# Patient Record
Sex: MALE | Race: WHITE | NOT HISPANIC OR LATINO | Employment: FULL TIME | URBAN - METROPOLITAN AREA
[De-identification: names, ages, dates, MRNs, and addresses within clinical notes are randomized per-mention and may not be internally consistent; named-entity substitution may affect disease eponyms.]

---

## 2021-11-30 ENCOUNTER — OFFICE VISIT (OUTPATIENT)
Dept: GASTROENTEROLOGY | Facility: CLINIC | Age: 46
End: 2021-11-30

## 2021-11-30 VITALS
HEIGHT: 72 IN | HEART RATE: 53 BPM | WEIGHT: 263 LBS | SYSTOLIC BLOOD PRESSURE: 121 MMHG | BODY MASS INDEX: 35.62 KG/M2 | DIASTOLIC BLOOD PRESSURE: 85 MMHG

## 2021-11-30 DIAGNOSIS — K76.0 FATTY LIVER: Primary | ICD-10-CM

## 2021-11-30 PROCEDURE — 99204 OFFICE O/P NEW MOD 45 MIN: CPT | Performed by: INTERNAL MEDICINE

## 2021-11-30 RX ORDER — ATORVASTATIN CALCIUM 10 MG/1
TABLET, FILM COATED ORAL
COMMUNITY
Start: 2021-09-15

## 2021-12-17 ENCOUNTER — TELEPHONE (OUTPATIENT)
Dept: GASTROENTEROLOGY | Facility: CLINIC | Age: 46
End: 2021-12-17

## 2021-12-17 NOTE — TELEPHONE ENCOUNTER
pts wife called saying we were supposed to send a medication in for this patient to the [facility] [place]-discussed at his 11/30 office visit, possibly a PPI, she wasn't sure what medication.

## 2022-02-01 RX ORDER — SOD SULF/POT CHLORIDE/MAG SULF 1.479 G
1 TABLET ORAL ONCE
Qty: 24 TABLET | Refills: 0 | Status: SHIPPED | OUTPATIENT
Start: 2022-02-01 | End: 2022-02-01

## 2022-02-11 ENCOUNTER — OUTSIDE FACILITY SERVICE (OUTPATIENT)
Dept: GASTROENTEROLOGY | Facility: CLINIC | Age: 47
End: 2022-02-11

## 2022-02-11 PROCEDURE — 45385 COLONOSCOPY W/LESION REMOVAL: CPT | Performed by: INTERNAL MEDICINE

## 2022-02-11 PROCEDURE — 88305 TISSUE EXAM BY PATHOLOGIST: CPT | Performed by: INTERNAL MEDICINE

## 2022-02-14 ENCOUNTER — LAB REQUISITION (OUTPATIENT)
Dept: LAB | Facility: HOSPITAL | Age: 47
End: 2022-02-14

## 2022-02-14 DIAGNOSIS — Z12.11 ENCOUNTER FOR SCREENING FOR MALIGNANT NEOPLASM OF COLON: ICD-10-CM

## 2022-02-14 DIAGNOSIS — D12.8 BENIGN NEOPLASM OF RECTUM: ICD-10-CM

## 2022-02-14 DIAGNOSIS — D12.7 BENIGN NEOPLASM OF RECTOSIGMOID JUNCTION: ICD-10-CM

## 2022-02-14 DIAGNOSIS — D12.3 BENIGN NEOPLASM OF TRANSVERSE COLON: ICD-10-CM

## 2022-02-15 LAB
CYTO UR: NORMAL
LAB AP CASE REPORT: NORMAL
LAB AP CLINICAL INFORMATION: NORMAL
PATH REPORT.FINAL DX SPEC: NORMAL
PATH REPORT.GROSS SPEC: NORMAL

## 2022-02-22 ENCOUNTER — TELEMEDICINE (OUTPATIENT)
Dept: GASTROENTEROLOGY | Facility: CLINIC | Age: 47
End: 2022-02-22

## 2022-02-22 DIAGNOSIS — R10.13 DISTRESS, EPIGASTRIC: ICD-10-CM

## 2022-02-22 DIAGNOSIS — K76.0 FATTY LIVER: Primary | ICD-10-CM

## 2022-02-22 PROCEDURE — 99214 OFFICE O/P EST MOD 30 MIN: CPT | Performed by: INTERNAL MEDICINE

## 2022-02-23 NOTE — PROGRESS NOTES
PCP:  Maria Esther Flynn MD     No referring provider defined for this encounter.    Chief Complaint   Patient presents with   • Abdominal Pain    You have chosen to receive care through a telehealth visit.  Do you consent to use a video/audio connection for your medical care today? Yes      HPI   The patient is a 46-year-old gentleman who had a recent colonoscopy on 02/11/2022.  He had hyperplastic polyps removed and a 10-year follow-up was suggested.  He was incidentally found to have fatty liver on CAT scan.  No hepatic mass was noted.  This was on 08/21/2021.  He has no history of elevated liver chemistries.  His liver chemistries on 07/30/2021 showed a bilirubin of 0.2, alk phos of 75, AST of 25, ALT of 40.  More recently his bilirubin was 0.2, AST 25, ALT 41.  This was on 01/03/2022.  His DILL Fibrosure at that time showed probable steatohepatitis with marked fatty liver.  No fibrosis was predicted.  Hepatitis C test was negative.    No Known Allergies       Current Outpatient Medications:   •  atorvastatin (LIPITOR) 10 MG tablet, , Disp: , Rfl:      Past Medical History:   Diagnosis Date   • Hyperlipidemia        History reviewed. No pertinent surgical history.     Social History     Socioeconomic History   • Marital status:    Tobacco Use   • Smoking status: Never Smoker   • Smokeless tobacco: Never Used   Substance and Sexual Activity   • Alcohol use: Never   • Drug use: Never        Family History   Problem Relation Age of Onset   • Colon cancer Neg Hx    • Colon polyps Neg Hx         Review of Systems     There were no vitals filed for this visit.     Physical Exam   General Appearance: Alert, in no acute distress   Head: Normocephalic, without obvious abnormality, atraumatic       Diagnoses and all orders for this visit:    1. Fatty liver (Primary)    2. Distress, epigastric    Impressions and plan #1 fatty liver: We again stressed modest weight reduction.  He has lost weight.  He is about 11  pounds down from his previous weight.  It would be prudent to check hepatitis A immunity.  We can check his hepatitis B status as well.  He could benefit from vaccination if he is not immune.    #2 epigastric distress: We are going to try some Protonix.  He has only recently started that he is not sure he is given enough time to work.  He does get more epigastric pain when he overeats.  He has been watching recently.  We will see him back in follow-up.  If the Protonix is not helpful we may need to evaluate with an upper endoscopy.  He will contact us if that is the case.    Can Toledo MD

## 2023-11-27 ENCOUNTER — OFFICE VISIT (OUTPATIENT)
Dept: GASTROENTEROLOGY | Facility: CLINIC | Age: 48
End: 2023-11-27
Payer: COMMERCIAL

## 2023-11-27 DIAGNOSIS — R79.89 ELEVATED FERRITIN: ICD-10-CM

## 2023-11-27 DIAGNOSIS — Z98.890 HISTORY OF COLONOSCOPY: ICD-10-CM

## 2023-11-27 DIAGNOSIS — E66.01 CLASS 2 SEVERE OBESITY WITH SERIOUS COMORBIDITY AND BODY MASS INDEX (BMI) OF 36.0 TO 36.9 IN ADULT, UNSPECIFIED OBESITY TYPE: ICD-10-CM

## 2023-11-27 DIAGNOSIS — K75.81 NASH (NONALCOHOLIC STEATOHEPATITIS): ICD-10-CM

## 2023-11-27 DIAGNOSIS — R74.8 ABNORMAL LIVER ENZYMES: Primary | ICD-10-CM

## 2023-11-27 RX ORDER — PANTOPRAZOLE SODIUM 40 MG/1
TABLET, DELAYED RELEASE ORAL
COMMUNITY
Start: 2023-09-15

## 2023-11-27 NOTE — PROGRESS NOTES
GASTROENTEROLOGY OFFICE NOTE    Milo Stubbs  [id number]  1975    CARE TEAM  Patient Care Team:  Maria Esther Flynn MD as PCP - General (Emergency Medicine)    Referring Provider: No ref. provider found    Chief Complaint   Patient presents with    Elevated Hepatic Enzymes     Follow up. States he had some abnormal labs at PCP.         HISTORY OF PRESENT ILLNESS:   Milo Stubbs is a 48 y.o. male who returns to the office due to elevated liver enzymes. 11/17/2023 ferritin elevated at 706. GGT normal 38. CMP with hyperglycemia result of 118, elevated AST 54 and elevated ALT of 92. B12 and folate normal. Iron profile normal with iron saturation of 42%. Acute hepatitis panel negative. Amylase and lipase normal.       Review of record reveals Evaluation by Dr. Toledo 11/30/2021 following ultrasound that revealed fatty infiltration of the liver performed due to burning in the right upper quadrant, 4.5 cm echogenic focus in the left lobe of the liver for which CT or MRI was recommended alkaline phosphatase was 75, AST 25, ALT 40, and bilirubin 0.2. On 8/7/2021 alkaline phosphatase was 75, AST 25, ALT 40, and bilirubin 0.2 .  Review of documentation reveals his weight in high school was 135 pounds,  weight 262 pounds at time of that visit, today's weight 269.8 (weight rechecked after removing items from his pockets).  CT abdomen and pelvis with contrast revealed diffuse hepatic steatosis without mass lesion identified on 8/27/2021 at [facility].  Weight loss recommended. DILL FibroSure ordered.  1/3/2022 DILL FibroSure revealed F0, S3, N1, normal ALT 41, normal AST 25, normal bilirubin 1.2, hypercholesterolemia with result of 271, hyperglycemia with result of 117, hypertriglyceridemia with result of 262.    2/11/2022 colonoscopy per Dr. Toledo for screening revealed 2 polyps in the transverse colon at 70 cm proximal to the anus, polyps were 5 to 6 mm in size and 2 sessile polyps in the rectum and  "rectosigmoid colon that were 4 to 6 mm in size.  Transverse colon polyp hyperplastic polyp, rectal polyp hyperplastic polyp.  Repeat colonoscopy recommended in 10 years.    2/22/2022 telehealth visit revealed Amezquita FibroSure that revealed probable steatohepatitis with marked fatty liver without fibrosis, negative hepatitis C test with documentation he had lost approximately 11 pounds.  Recommendation for patient to continue pantoprazole and if not helpful for epigastric discomfort then consider EGD for additional evaluation.  Hepatitis A and B serology ordered to evaluate for immunity as well as hepatitis B infection but not done.     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     He quit drinking alcohol years ago.   Past Medical History:   Diagnosis Date    Colon polyp     GERD (gastroesophageal reflux disease)     Hyperlipidemia     Hypertension         Past Surgical History:   Procedure Laterality Date    COLONOSCOPY          Current Outpatient Medications on File Prior to Visit   Medication Sig    pantoprazole (PROTONIX) 40 MG EC tablet      No current facility-administered medications on file prior to visit.       No Known Allergies    Family History   Problem Relation Age of Onset    Colon cancer Neg Hx     Colon polyps Neg Hx        Social History     Socioeconomic History    Marital status:    Tobacco Use    Smoking status: Former     Types: Cigarettes    Smokeless tobacco: Never   Vaping Use    Vaping Use: Never used   Substance and Sexual Activity    Alcohol use: Not Currently    Drug use: Never    Sexual activity: Defer       PHYSICAL EXAM   /59 (BP Location: Left arm, Patient Position: Sitting, Cuff Size: Adult)   Pulse 70   Ht 182.9 cm (72\")   Wt 122 kg (269 lb 12.8 oz)   BMI 36.59 kg/m²   Physical Exam  Constitutional:       General: He is not in acute distress.     Appearance: He is obese. He is not toxic-appearing.   HENT:      Head: " Normocephalic and atraumatic. No contusion.      Right Ear: External ear normal.      Left Ear: External ear normal.   Eyes:      General: Lids are normal. No scleral icterus.        Right eye: No discharge.         Left eye: No discharge.      Extraocular Movements: Extraocular movements intact.   Neck:      Trachea: Trachea normal.      Comments: No visible mass  No visible adenopathy  Cardiovascular:      Rate and Rhythm: Normal rate.   Pulmonary:      Effort: No respiratory distress.      Comments: Symmetrical expansion    Musculoskeletal:      Comments: Symmetrical movement of upper extremities  Symmetrical movement of lower extremities  No visible deformities   Skin:     General: Skin is warm and dry.      Coloration: Skin is not jaundiced.   Neurological:      General: No focal deficit present.      Mental Status: He is alert and oriented to person, place, and time.   Psychiatric:         Mood and Affect: Mood normal.         Behavior: Behavior normal.         Thought Content: Thought content normal.     Results Review:  Prior ultrasound that revealed fatty infiltration of the liver performed due to burning in the right upper quadrant, 4.5 cm echogenic focus in the left lobe of the liver for which CT or MRI was recommended . alkaline phosphatase was 75, AST 25, ALT 40, and bilirubin 0.2. On 8/7/2021 alkaline phosphatase was 75, AST 25, ALT 40, and bilirubin 0.2 .      CT abdomen and pelvis with contrast revealed diffuse hepatic steatosis without mass lesion identified on 8/27/2021 at [facility].      1/3/2022 DILL FibroSure revealed F0, S3, N1, normal ALT 41, normal AST 25, normal bilirubin 1.2, hypercholesterolemia with result of 271, hyperglycemia with result of 117, hypertriglyceridemia with result of 262.    2/11/2022 colonoscopy per Dr. Toledo for screening revealed 2 polyps in the transverse colon at 70 cm proximal to the anus, polyps were 5 to 6 mm in size and 2 sessile polyps in the  rectum and rectosigmoid colon that were 4 to 6 mm in size.  Transverse colon polyp hyperplastic polyp, rectal polyp hyperplastic polyp.  Repeat colonoscopy recommended in 10 years.    2/22/2022 telehealth visit revealed Dill FibroSure that revealed probable steatohepatitis with marked fatty liver without fibrosis, negative hepatitis C test with documentation he had lost approximately 11 pounds.  Recommendation for patient to continue pantoprazole and if not helpful for epigastric discomfort then consider EGD for additional evaluation.  Hepatitis A and B serology ordered to evaluate for immunity as well as hepatitis B infection but not done.    11/17/2023 ferritin elevated at 706. GGT normal 38. CMP with hyperglycemia result of 118, elevated AST 54 and elevated ALT of 92. B12 and folate normal. Iron profile normal with iron saturation of 42%. Acute hepatitis panel negative. Amylase and lipase normal.    ASSESSMENT / PLAN  1. Abnormal liver enzymes  2. Elevated ferritin  3. DILL (nonalcoholic steatohepatitis)  4. Class 2 severe obesity with serious comorbidity and body mass index (BMI) of 36.0 to 36.9 in adult, unspecified obesity type  - elevated liver enzymes likely due to DILL for which I recommend weight loss via diet and exercise.   - he received hepatitis A vaccine 4/26/2018 and 7/30/2021  - he received hepatitis B vaccine, 3 doses in 2022  - unknown etiology/reason for elevated ferritin as ferritin is an acute phase reactant. Iron profile with iron saturation of 42% is not concerning for iron overload. Weight loss to treat suspected DILL could be helpful for elevated ferritin. Check hemochromatosis mutation lab  - consider intermittently checking INR (prolonged INR can be concerning for cirrhosis), CBC (thrombocytopenia can be concerning for cirrhosis) and check CMP to evaluate liver enzymes as well as albumin level (hypoalbuminemia can be found in cirrhosis)     - Protime-INR; Future  - Comprehensive Metabolic  Panel; Future  - CBC (No Diff); Future  - Hemochromatosis Mutation; Future    5. History of colonoscopy  - history of hyperplastic polyps, due for repeat colonoscopy 2/2032    Prior documentation of burning in upper abdomen for which pantoprazole 40 mg daily was prescribed  Return in about 3 months (around 2/27/2024).    Gloria Medina, APRN  11/27/2023

## 2023-12-02 VITALS
HEART RATE: 70 BPM | HEIGHT: 72 IN | DIASTOLIC BLOOD PRESSURE: 59 MMHG | WEIGHT: 269.8 LBS | SYSTOLIC BLOOD PRESSURE: 135 MMHG | BODY MASS INDEX: 36.54 KG/M2

## 2025-02-25 ENCOUNTER — TRANSCRIBE ORDERS (OUTPATIENT)
Dept: ADMINISTRATIVE | Facility: HOSPITAL | Age: 50
End: 2025-02-25
Payer: COMMERCIAL

## 2025-02-25 DIAGNOSIS — Z12.2 ENCOUNTER FOR SCREENING FOR MALIGNANT NEOPLASM OF RESPIRATORY ORGANS: Primary | ICD-10-CM

## 2025-03-28 ENCOUNTER — HOSPITAL ENCOUNTER (OUTPATIENT)
Dept: CT IMAGING | Facility: HOSPITAL | Age: 50
Discharge: HOME | End: 2025-03-28
Admitting: GENERAL PRACTICE
Payer: COMMERCIAL

## 2025-03-28 DIAGNOSIS — Z12.2 ENCOUNTER FOR SCREENING FOR MALIGNANT NEOPLASM OF RESPIRATORY ORGANS: ICD-10-CM

## 2025-03-28 PROCEDURE — 71271 CT THORAX LUNG CANCER SCR C-: CPT
